# Patient Record
Sex: FEMALE | Race: WHITE | NOT HISPANIC OR LATINO | Employment: STUDENT | URBAN - METROPOLITAN AREA
[De-identification: names, ages, dates, MRNs, and addresses within clinical notes are randomized per-mention and may not be internally consistent; named-entity substitution may affect disease eponyms.]

---
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
## 2024-12-23 ENCOUNTER — APPOINTMENT (EMERGENCY)
Dept: RADIOLOGY | Facility: HOSPITAL | Age: 19
End: 2024-12-23
Payer: COMMERCIAL

## 2024-12-23 ENCOUNTER — HOSPITAL ENCOUNTER (EMERGENCY)
Facility: HOSPITAL | Age: 19
Discharge: HOME | End: 2024-12-23
Attending: EMERGENCY MEDICINE
Payer: COMMERCIAL

## 2024-12-23 VITALS
TEMPERATURE: 97.7 F | BODY MASS INDEX: 42.95 KG/M2 | WEIGHT: 290 LBS | OXYGEN SATURATION: 100 % | RESPIRATION RATE: 16 BRPM | HEART RATE: 105 BPM | HEIGHT: 69 IN

## 2024-12-23 DIAGNOSIS — S63.501A SPRAIN OF RIGHT WRIST, INITIAL ENCOUNTER: ICD-10-CM

## 2024-12-23 DIAGNOSIS — T07.XXXA MULTIPLE CONTUSIONS: ICD-10-CM

## 2024-12-23 DIAGNOSIS — S93.409A ANKLE SPRAIN: ICD-10-CM
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
## 2024-12-23 PROCEDURE — 73110 X-RAY EXAM OF WRIST: CPT

## 2024-12-23 PROCEDURE — 99283 EMERGENCY DEPT VISIT LOW MDM: CPT

## 2024-12-23 PROCEDURE — 73610 X-RAY EXAM OF ANKLE: CPT

## 2024-12-23 PROCEDURE — 73630 X-RAY EXAM OF FOOT: CPT

## 2024-12-23 PROCEDURE — 99284 EMERGENCY DEPT VISIT MOD MDM: CPT | Performed by: EMERGENCY MEDICINE

## 2024-12-23 RX ORDER — NAPROXEN 500 MG/1
500 TABLET ORAL ONCE
Status: COMPLETED | OUTPATIENT
Start: 2024-12-23 | End: 2024-12-23

## 2024-12-23 RX ORDER — NAPROXEN 500 MG/1
500 TABLET ORAL 2 TIMES DAILY WITH MEALS
Qty: 10 TABLET | Refills: 0 | Status: SHIPPED | OUTPATIENT
Start: 2024-12-23

## 2024-12-23 RX ADMIN — NAPROXEN 500 MG: 500 TABLET ORAL at 22:22

## 2024-12-24 NOTE — DISCHARGE INSTRUCTIONS
Your xrays are ok, the areas are bruised and/or sprained.  Rest, ice and elevate off and on.  Use the anti-inflammatory as prescribed and can also use tylenol.

## 2024-12-24 NOTE — ED PROVIDER NOTES
"Time reflects when diagnosis was documented in both MDM as applicable and the Disposition within this note       Time User Action Codes Description Comment    12/23/2024 10:16 PM Zahida Lerma Add [W10.8XXA] Fall down stairs, initial encounter     12/23/2024 10:16 PM Zahida Lerma Add [S63.501A] Sprain of right wrist, initial encounter     12/23/2024 10:17 PM Zahida Lerma Add [S93.409A] Ankle sprain     12/23/2024 10:17 PM Zahida Lerma Add [T07.XXXA] Multiple contusions           ED Disposition       ED Disposition   Discharge    Condition   Stable    Date/Time   Mon Dec 23, 2024 10:16 PM    Comment   Kylie Raines discharge to home/self care.                   Assessment & Plan       Medical Decision Making  Xrays negative.  Advised frances ZAZUETArosmac prn.    Amount and/or Complexity of Data Reviewed  Radiology: ordered and independent interpretation performed.    Risk  Prescription drug management.             Medications   naproxen (NAPROSYN) tablet 500 mg (has no administration in time range)       ED Risk Strat Scores            CRAFFT      Flowsheet Row Most Recent Value   CRAFFT Initial Screen: During the past 12 months, did you:    1. Drink any alcohol (more than a few sips)?  No Filed at: 12/23/2024 2104   2. Smoke any marijuana or hashish No Filed at: 12/23/2024 2104   3. Use anything else to get high? (\"anything else\" includes illegal drugs, over the counter and prescription drugs, and things that you sniff or 'baptiste')? No Filed at: 12/23/2024 2104                                          History of Present Illness       Chief Complaint   Patient presents with    Wrist Injury     Pt. States she  fell down about 10 rugged stairs on  Friday now having right wrist pain and right ankle pain.  Pt. States did not take any pain medication today    Ankle Injury       Past Medical History:   Diagnosis Date    ADHD (attention deficit hyperactivity disorder)     Anxiety     Depression     Eating disorder     " resolved      Past Surgical History:   Procedure Laterality Date    NO PAST SURGERIES        No family history on file.   Social History     Tobacco Use    Smoking status: Never    Smokeless tobacco: Never   Vaping Use    Vaping status: Every Day    Substances: Nicotine, Flavoring   Substance Use Topics    Alcohol use: Yes     Comment: socailly    Drug use: Never      E-Cigarette/Vaping    E-Cigarette Use Current Every Day User       E-Cigarette/Vaping Substances    Nicotine Yes     Flavoring Yes       I have reviewed and agree with the history as documented.     18 yo female fell down carpeted steps 4 days and landed on tile landing.  She did hit her forehead but no LOC.  She bruised both knees but her knees are not hurting.  She c/o right wrist pain and right ankle pain.  The ankle pain is when she walks on it.  No recent illness.  No fever, cough, vomiting, blurred vision.  +mild pain along both sides of neck.      History provided by:  Patient   used: No    Ankle Injury  Associated symptoms: no cough, no diarrhea, no fever and no vomiting        Review of Systems   Constitutional:  Negative for fever.   Eyes:  Negative for visual disturbance.   Respiratory:  Negative for cough.    Gastrointestinal:  Negative for diarrhea and vomiting.   Musculoskeletal:  Positive for neck pain. Negative for back pain.   Skin:  Positive for wound.           Objective       ED Triage Vitals [12/23/24 2101]   Temperature Pulse BP Respirations SpO2 Patient Position - Orthostatic VS   97.7 °F (36.5 °C) 105 -- 16 100 % --      Temp src Heart Rate Source BP Location FiO2 (%) Pain Score    -- -- -- -- 6      Vitals      Date and Time Temp Pulse SpO2 Resp BP Pain Score FACES Pain Rating User   12/23/24 2101 97.7 °F (36.5 °C) 105 100 % 16 -- 6 -- HD            Physical Exam  Vitals and nursing note reviewed.   Constitutional:       General: She is not in acute distress.     Appearance: She is well-developed. She is not  ill-appearing or diaphoretic.   HENT:      Head: Normocephalic and atraumatic.   Eyes:      General: No scleral icterus.     Conjunctiva/sclera: Conjunctivae normal.   Cardiovascular:      Rate and Rhythm: Normal rate and regular rhythm.      Heart sounds: Normal heart sounds. No murmur heard.  Pulmonary:      Effort: Pulmonary effort is normal. No respiratory distress.      Breath sounds: Normal breath sounds.   Abdominal:      General: Bowel sounds are normal. There is no distension.      Palpations: Abdomen is soft.      Tenderness: There is no abdominal tenderness.   Musculoskeletal:         General: Tenderness and signs of injury present. No deformity. Normal range of motion.      Cervical back: Normal range of motion and neck supple. No tenderness.      Right lower leg: No edema.      Left lower leg: No edema.      Comments: + ttp right wrist, ulnar side, rom intact, radial pulse palp, no tenderness of forearm or elbow.  + bruising and scabbed abrasions both knees.  + bruising base of left toes with ttp.  + ttp lateral mall right ankle, achilles mech intact, DP palp.   Skin:     General: Skin is warm and dry.      Coloration: Skin is not pale.      Findings: No rash.   Neurological:      General: No focal deficit present.      Mental Status: She is alert and oriented to person, place, and time.      Cranial Nerves: No cranial nerve deficit.      Motor: No weakness.   Psychiatric:         Mood and Affect: Mood normal.         Behavior: Behavior normal.         Results Reviewed       None            XR foot 3+ vw left   ED Interpretation by Zahida Lerma MD (12/23 2213)   negative      XR ankle 3+ views RIGHT   ED Interpretation by Zahida Lerma MD (12/23 2213)   negative      XR wrist 3+ views RIGHT   ED Interpretation by Zahida Lerma MD (12/23 2213)   negative          Procedures    ED Medication and Procedure Management   Prior to Admission Medications   Prescriptions Last Dose Informant Patient Reported?  Taking?   QUEtiapine (SEROquel) 100 mg tablet Not Taking  Yes No   Sig: Take 100 mg by mouth daily   Patient not taking: Reported on 2024   amphetamine-dextroamphetamine (ADDERALL XR) 20 MG 24 hr capsule Not Taking  Yes No   Sig: Take 20 mg by mouth every morning   Patient not taking: Reported on 2024   fluticasone (FLONASE) 50 mcg/act nasal spray Not Taking  No No   Si spray into each nostril 2 (two) times a day   Patient not taking: Reported on 2024      Facility-Administered Medications: None     Patient's Medications   Discharge Prescriptions    NAPROXEN (NAPROSYN) 500 MG TABLET    Take 1 tablet (500 mg total) by mouth 2 (two) times a day with meals       Start Date: 2024End Date: --       Order Dose: 500 mg       Quantity: 10 tablet    Refills: 0     No discharge procedures on file.  ED SEPSIS DOCUMENTATION   Time reflects when diagnosis was documented in both MDM as applicable and the Disposition within this note       Time User Action Codes Description Comment    2024 10:16 PM Zahida Lerma Add [W10.8XXA] Fall down stairs, initial encounter     2024 10:16 PM Zahida Lerma Add [S63.501A] Sprain of right wrist, initial encounter     2024 10:17 PM Zahida Lerma [S93.409A] Ankle sprain     2024 10:17 PM Zahida Lerma Add [T07.XXXA] Multiple contusions                  Zahida Lerma MD  24 [id number]